# Patient Record
Sex: FEMALE | Race: WHITE | Employment: FULL TIME | URBAN - METROPOLITAN AREA
[De-identification: names, ages, dates, MRNs, and addresses within clinical notes are randomized per-mention and may not be internally consistent; named-entity substitution may affect disease eponyms.]

---

## 2017-07-11 ENCOUNTER — HOSPITAL ENCOUNTER (OUTPATIENT)
Dept: PHYSICAL THERAPY | Age: 52
Discharge: HOME | End: 2017-07-11
Payer: OTHER GOVERNMENT

## 2017-07-11 PROCEDURE — 97018 PARAFFIN BATH THERAPY: CPT

## 2017-07-11 PROCEDURE — 97165 OT EVAL LOW COMPLEX 30 MIN: CPT

## 2017-07-11 PROCEDURE — 97110 THERAPEUTIC EXERCISES: CPT

## 2017-07-11 NOTE — PROGRESS NOTES
OT EVALTREATMENT NOTE 4-16    Patient Name: Gloria Lara  Date:2017  : 1965  [x]  Patient  Verified  Payor:  / Plan: [facility]  RETIREES AND DEPENDENTS / Product Type:  /    In time:245  Out time:245  Total Treatment Time (min): 60  Visit #: 1 of 8    Treatment Area: Pain in right hand [M79.641]    SUBJECTIVE  Pain Level (0-10 scale): 2  Any medication changes, allergies to medications, adverse drug reactions, diagnosis change, or new procedure performed?: [x] No    [] Yes (see summary sheet for update)  Subjective functional status/changes:   [x] See paper eval form in chart    OBJECTIVE     Modality rationale: decrease pain and increase tissue extensibility to improve the patients ability to grasp   Min Type Additional Details    [] Estim:  []Unatt       []IFC  []Premod                        []Other:  []w/ice   []w/heat  Position:  Location:    [] Estim: []Att    []TENS instruct  []NMES                    []Other:  []w/US   []w/ice   []w/heat  Position:  Location:    []  Traction: [] Cervical       []Lumbar                       [] Prone          []Supine                       []Intermittent   []Continuous Lbs:  [] before manual  [] after manual    []  Ultrasound: []Continuous   [] Pulsed                           []1MHz   []3MHz Location:  W/cm2:    []  Iontophoresis with dexamethasone         Location: [] Take home patch   [] In clinic    []  Ice     []  heat  []  Ice massage  []  Laser   []  Anodyne Position:  Location:   10 []  Laser with stim  [x]  Other: paraffin wax  Position:  Location: right hand    []  Vasopneumatic Device Pressure:       [] lo [] med [] hi   Temperature: [] lo [] med [] hi   [x] Skin assessment post-treatment:  [x]intact []redness- no adverse reaction    []redness - adverse reaction:     15 min Therapeutic Exercise: see flow sheet   Rationale: increase ROM and increase strength  to improve the patients ability to grasp without pain.     With   [x] TE [] TA   [] neuro   [] other: Patient Education: [x] Review HEP    [] Progressed/Changed HEP based on:   [] positioning   [] body mechanics   [] transfers   [] heat/ice application   [] Splint wear/care   [] Sensory re-education   [] scar management      [] other:            Pain Level (0-10 scale) post treatment: 2    ASSESSMENT/Changes in Function:      [x]  See Plan of Care         Patient Goals:  Short Term Goals: To be accomplished in 2  weeks:  Goal:* Patient will be compliant with initial home exercise program to take an active role in their rehabilitation process. Status at eval:  HEP initiated    Long Term Goals: To be accomplished in 4 weeks:  Goal:*Patient will increase right hand  strength by at least 10 lbs demonstrating increased functional use. Status at eval: 13 lbs    Goal:* Patient will report 2/10 or less pain with typing and work related activities. Status at Eval: 5+    Goal:* Patient will improve FOTO score by at least 10 points demonstrating increased functional use of left hand.   Status at Eval: 37    PLAN  [x]  Upgrade activities as tolerated     [x]  Continue plan of care  []  Update interventions per flow sheet       []  Discharge due to:_  []  Other:_      Dahlia Flood OT 7/11/2017  2:42 PM

## 2017-07-11 NOTE — PROGRESS NOTES
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 Assessment/ key information:   Patient is a 46year old female presents with right hand pain. Patient reports pain has been ongoing since May 2017 after gardening and spreading mulch around her yard. Patient reports middle finger (right) begins to swell mid afternoon and pain begins at nighttime. Patient reports no relief from ice or heat. Patient displaying triggering at bilateral middle fingers with no pain involved. Patients right hand is mildly stiff and limited in  strength when compared to left. Patient will benefit from OT to address these deficits.     Evaluation Complexity: History LOW Complexity : Brief history review  Examination LOW Complexity : 1-3 performance deficits relating to physical, cognitive , or psychosocial skils that result in activity limitations and / or participation restrictions  Clinical Decision Making LOW Complexity : No comorbidities that affect functional and no verbal or physical assistance needed to complete eval tasks   Overall Complexity Rating: LOW     Problem List: Pain effecting function, Decreased range of motion, Decreased strength, Decreased coordination/prehension and Decreased ADL/functional abilities      Treatment Plan may include any combination of the following: Therapeutic exercise, Therapeutic activities, Physical agent/modality, Patient education and ADLs/IADLs    Patient / Family readiness to learn indicated by: asking questions, trying to perform skills and interest    Persons(s) to be included in education:   patient (P)    Barriers to Learning/Limitations: None    Patient Goal (s): To get rid of pain.     Patient Self Reported Health Status: excellent    Rehabilitation Potential: good    Short Term Goals: To be accomplished in 2  weeks:  Goal:* Patient will be compliant with initial home exercise program to take an active role in their rehabilitation process. Status at eval:  HEP initiated    Long Term Goals: To be accomplished in 4 weeks:  Goal:*Patient will increase right hand  strength by at least 10 lbs demonstrating increased functional use. Status at eval: 13 lbs    Goal:* Patient will report 2/10 or less pain with typing and work related activities. Status at Eval: 5+    Goal:* Patient will improve FOTO score by at least 10 points demonstrating increased functional use of left hand. Status at Eval: 37     Frequency / Duration: Patient to be seen 2 times per week for 4 weeks:    Patient/ Caregiver education and instruction:Diagnosis, prognosis, activity modification and exercises  [x]  Plan of care has been reviewed with [place], OT 7/11/2017 2:42 PM________________________________________________________________________    I certify that the above Therapy Services are being furnished while the patient is under my care. I agree with the treatment plan and certify that this therapy is necessary.     Physician's Signature:____________________  Date:____________Time:__________    Please sign and return to [facility] [address] [place], [address] [facility]  Ph [phone]  Fx [phone]

## 2017-07-18 ENCOUNTER — HOSPITAL ENCOUNTER (OUTPATIENT)
Dept: PHYSICAL THERAPY | Age: 52
Discharge: HOME | End: 2017-07-18
Payer: OTHER GOVERNMENT

## 2017-07-18 PROCEDURE — 97018 PARAFFIN BATH THERAPY: CPT

## 2017-07-18 PROCEDURE — 97110 THERAPEUTIC EXERCISES: CPT

## 2017-07-18 NOTE — PROGRESS NOTES
OT DAILY TREATMENT NOTE      Patient Name: Dottie Sue  Date:2017  : 1965  [x]  Patient  Verified  Payor:  / Plan: [facility]  RETIREES AND DEPENDENTS / Product Type: Merry Leak /    In time:8:00  Out time:840  Total Treatment Time (min): 40  Visit #: 2 of 8     Treatment Area: Pain in right hand [M28.193]    SUBJECTIVE  Pain Level (0-10 scale): 2  Any medication changes, allergies to medications, adverse drug reactions, diagnosis change, or new procedure performed?: [x] No    [] Yes (see summary sheet for update)  Subjective functional status/changes:   [] No changes reported  \"It gets really stiff when I walk. \"    OBJECTIVE     Modality rationale: decrease pain and increase tissue extensibility to improve the patients ability to grasp release and carry items.    Min Type Additional Details    [] Estim:  []Unatt       []IFC  []Premod                        []Other:  []w/ice   []w/heat  Position:  Location:    [] Estim: []Att    []TENS instruct  []NMES                    []Other:  []w/US   []w/ice   []w/heat  Position:  Location:    []  Traction: [] Cervical       []Lumbar                       [] Prone          []Supine                       []Intermittent   []Continuous Lbs:  [] before manual  [] after manual    []  Ultrasound: []Continuous   [] Pulsed                           []1MHz   []3MHz W/cm2:  Location:    []  Iontophoresis with dexamethasone         Location: [] Take home patch   [] In clinic    []  Ice     []  heat  []  Ice massage  []  Laser   []  Anodyne Position:  Location:   10 []  Laser with stim  [x]  Other: paraffin wax  Position: resting  Location: right hand    []  Vasopneumatic Device Pressure:       [] lo [] med [] hi   Temperature: [] lo [] med [] hi   [x] Skin assessment post-treatment:  [x]intact []redness- no adverse reaction    []redness - adverse reaction:      30 min Therapeutic Exercise:  [x] See flow sheet :   Rationale: increase ROM and increase strength to improve the patients ability to perform functional grasping. With   [x] TE   [] TA   [] neuro   [] other: Patient Education: [x] Review HEP    [] Progressed/Changed HEP based on:   [] positioning   [] body mechanics   [] transfers   [] heat/ice application   [] Splint wear/care   [] Sensory re-education   [] scar management      [] other:          Other Objective/Functional Measures: none taken today. Pain Level (0-10 scale) post treatment: 2/10    ASSESSMENT/Changes in Function: Patient tolerated OT well today. Patient reports exercises helping minimally. Patient will continue to benefit from skilled OT services to address ROM deficits, address strength deficits and analyze and address soft tissue restrictions to attain remaining goals. [x]  See Plan of Care  []  See progress note/recertification  []  See Discharge Summary         Progress towards goals / Updated goals:  Short Term Goals: To be accomplished in 2  weeks:  Goal:* Patient will be compliant with initial home exercise program to take an active role in their rehabilitation process. Status at eval:  HEP initiated  Current Status: Patient continuing HEP.     Long Term Goals: To be accomplished in 4 weeks:  Goal:*Patient will increase right hand  strength by at least 10 lbs demonstrating increased functional use. Status at eval: 13 lbs  Current Status: NT     Goal:* Patient will report 2/10 or less pain with typing and work related activities. Status at Eval: 5+  Current Status: NT     Goal:* Patient will improve FOTO score by at least 10 points demonstrating increased functional use of left hand.   Status at Eval: 37  Current Status: NT    PLAN  [x]  Upgrade activities as tolerated     [x]  Continue plan of care  []  Update interventions per flow sheet       []  Discharge due to:_  []  Other:_      Lui Warren OT 7/18/2017  8:11 AM

## 2017-07-25 ENCOUNTER — HOSPITAL ENCOUNTER (OUTPATIENT)
Dept: PHYSICAL THERAPY | Age: 52
Discharge: HOME | End: 2017-07-25
Payer: OTHER GOVERNMENT

## 2017-07-25 PROCEDURE — 97110 THERAPEUTIC EXERCISES: CPT

## 2017-07-25 PROCEDURE — 97018 PARAFFIN BATH THERAPY: CPT

## 2017-07-25 NOTE — PROGRESS NOTES
OT DAILY TREATMENT NOTE      Patient Name: Skylar Birch  Date:2017  : 1965  [x]  Patient  Verified  Payor:  / Plan: [facility]  RETIREES AND DEPENDENTS / Product Type: Creasie Decree /    In time:8:30  Out time:915  Total Treatment Time (min): 45  Visit #: 3 of 8    Treatment Area: Pain in right hand [M27.501]    SUBJECTIVE  Pain Level (0-10 scale): 2/10  Any medication changes, allergies to medications, adverse drug reactions, diagnosis change, or new procedure performed?: [x] No    [] Yes (see summary sheet for update)  Subjective functional status/changes:   [] No changes reported  \"I think I am getting better! I was able to spray this can today when I was working in the yard. \"    OBJECTIVE    Modality rationale: decrease pain and increase tissue extensibility to improve the patients ability to grasp release and perform functional IADLs.    Min Type Additional Details    [] Estim:  []Unatt       []IFC  []Premod                        []Other:  []w/ice   []w/heat  Position:  Location:    [] Estim: []Att    []TENS instruct  []NMES                    []Other:  []w/US   []w/ice   []w/heat  Position:  Location:    []  Traction: [] Cervical       []Lumbar                       [] Prone          []Supine                       []Intermittent   []Continuous Lbs:  [] before manual  [] after manual    []  Ultrasound: []Continuous   [] Pulsed                           []1MHz   []3MHz W/cm2:  Location:    []  Iontophoresis with dexamethasone         Location: [] Take home patch   [] In clinic    []  Ice     []  heat  []  Ice massage  []  Laser   []  Anodyne Position:  Location:   10 []  Laser with stim  [x]  Other: paraffin wax  Position: resting  Location: right hand    []  Vasopneumatic Device Pressure:       [] lo [] med [] hi   Temperature: [] lo [] med [] hi   [x] Skin assessment post-treatment:  [x]intact []redness- no adverse reaction    []redness - adverse reaction:     35 min Therapeutic Exercise:  [x] See flow sheet :   Rationale: increase ROM, increase strength and improve coordination to improve the patients ability to perform functional IADLs with increased motion and decreased pain. With   [x] TE   [] TA   [] neuro   [] other: Patient Education: [x] Review HEP    [] Progressed/Changed HEP based on:   [] positioning   [] body mechanics   [] transfers   [] heat/ice application   [] Splint wear/care   [] Sensory re-education   [] scar management      [] other:         Other Objective/Functional Measures: none taken     Pain Level (0-10 scale) post treatment: 2    ASSESSMENT/Changes in Function: Patient tolerated OT well. Tolerated increase in resistive exercises well. Patient will continue to benefit from skilled OT services to address ROM deficits, address strength deficits and analyze and address soft tissue restrictions to attain remaining goals. [x]  See Plan of Care  []  See progress note/recertification  []  See Discharge Summary         Progress towards goals / Updated goals:  Short Term Goals: To be accomplished in 2  weeks:  Goal:* Patient will be compliant with initial home exercise program to take an active role in their rehabilitation process. Status at eval:  HEP initiated  Current Status: Patient continuing HEP.     Long Term Goals: To be accomplished in 4 weeks:  Goal:*Patient will increase right hand  strength by at least 10 lbs demonstrating increased functional use. Status at eval: 13 lbs  Current Status: NT      Goal:* Patient will report 2/10 or less pain with typing and work related activities. Status at Eval: 5+  Current Status: NT      Goal:* Patient will improve FOTO score by at least 10 points demonstrating increased functional use of left hand.   Status at Eval: 37  Current Status: NT    PLAN  [x]  Upgrade activities as tolerated     [x]  Continue plan of care  []  Update interventions per flow sheet       []  Discharge due to:_  []  Other:_      Rajni Freeman Garry Balbuena OT 7/25/2017  8:38 AM

## 2017-08-01 ENCOUNTER — HOSPITAL ENCOUNTER (OUTPATIENT)
Dept: PHYSICAL THERAPY | Age: 52
Discharge: HOME | End: 2017-08-01
Payer: OTHER GOVERNMENT

## 2017-08-01 PROCEDURE — 97018 PARAFFIN BATH THERAPY: CPT

## 2017-08-01 PROCEDURE — 97110 THERAPEUTIC EXERCISES: CPT

## 2017-08-01 NOTE — PROGRESS NOTES
OT DAILY TREATMENT NOTE      Patient Name: Saroj Crowe  Date:2017  : 1965   [x]  Patient  Verified  Payor:  / Plan: [facility]  RETIREES AND DEPENDENTS / Product Type: Kimberly Smiley /    In time:10:00  Out time:1035  Total Treatment Time (min): 35  Visit #: 4 of 8    Treatment Area: Pain in right hand [M79.641]    SUBJECTIVE  Pain Level (0-10 scale): 3/10  Any medication changes, allergies to medications, adverse drug reactions, diagnosis change, or new procedure performed?: [x] No    [] Yes (see summary sheet for update)  Subjective functional status/changes:   [] No changes reported  \"I think I may have done something to my hand. \"    OBJECTIVE    Modality rationale: increase tissue extensibility to improve the patients ability to grasp and carry.    Min Type Additional Details    [] Estim:  []Unatt       []IFC  []Premod                        []Other:  []w/ice   []w/heat  Position:  Location:    [] Estim: []Att    []TENS instruct  []NMES                    []Other:  []w/US   []w/ice   []w/heat  Position:  Location:    []  Traction: [] Cervical       []Lumbar                       [] Prone          []Supine                       []Intermittent   []Continuous Lbs:  [] before manual  [] after manual    []  Ultrasound: []Continuous   [] Pulsed                           []1MHz   []3MHz W/cm2:  Location:    []  Iontophoresis with dexamethasone         Location: [] Take home patch   [] In clinic    []  Ice     []  heat  []  Ice massage  []  Laser   []  Anodyne Position:  Location:   10 []  Laser with stim  [x]  Other: parrafin wax Position:  Location:    []  Vasopneumatic Device Pressure:       [] lo [] med [] hi   Temperature: [] lo [] med [] hi   [x] Skin assessment post-treatment:  [x]intact []redness- no adverse reaction    []redness - adverse reaction:     25 min Therapeutic Exercise:  [x] See flow sheet :   Rationale: increase ROM and increase strength to improve the patients ability to perform functional grasping and lifting. With   [x] TE   [] TA   [] neuro   [] other: Patient Education: [x] Review HEP    [] Progressed/Changed HEP based on:   [] positioning   [] body mechanics   [] transfers   [] heat/ice application   [] Splint wear/care   [] Sensory re-education   [] scar management      [] other:          Other Objective/Functional Measures: None taken today. Pain Level (0-10 scale) post treatment: 3    ASSESSMENT/Changes in Function: Patient reports increased in stiffness today, reports she feels a minor set back- went on an obstacle course last week which may have done it. Reassess next visit. Patient will continue to benefit from skilled OT services to address ROM deficits, address strength deficits and analyze and address soft tissue restrictions to attain remaining goals. [x]  See Plan of Care  []  See progress note/recertification  []  See Discharge Summary         Progress towards goals / Updated goals:  Short Term Goals: To be accomplished in 2  weeks:  Goal:* Patient will be compliant with initial home exercise program to take an active role in their rehabilitation process. Status at eval:  HEP initiated  Current Status: Patient continuing HEP.     Long Term Goals: To be accomplished in 4 weeks:  Goal:*Patient will increase right hand  strength by at least 10 lbs demonstrating increased functional use. Status at eval: 13 lbs  Current Status: NT      Goal:* Patient will report 2/10 or less pain with typing and work related activities. Status at Eval: 5+  Current Status: NT      Goal:* Patient will improve FOTO score by at least 10 points demonstrating increased functional use of left hand.   Status at Eval: 37  Current Status: NT    PLAN  [x]  Upgrade activities as tolerated     [x]  Continue plan of care  []  Update interventions per flow sheet       []  Discharge due to:_  []  Other:_      Vipin Coyle OT 8/1/2017  10:30 AM

## 2017-08-15 ENCOUNTER — HOSPITAL ENCOUNTER (OUTPATIENT)
Dept: PHYSICAL THERAPY | Age: 52
Discharge: HOME | End: 2017-08-15
Payer: OTHER GOVERNMENT

## 2017-08-15 PROCEDURE — 97110 THERAPEUTIC EXERCISES: CPT

## 2017-08-15 PROCEDURE — 97018 PARAFFIN BATH THERAPY: CPT

## 2017-08-15 NOTE — PROGRESS NOTES
OT DAILY TREATMENT NOTE      Patient Name: Nasir Saeed  Date:8/15/2017  : 1965  [x]  Patient  Verified  Payor: [facility] / Plan: [facility]  RETIREES AND DEPENDENTS / Product Type: T[employer] Putty /    In time:900  Out time:935  Total Treatment Time (min): 35  Visit #: 5 of 8    Treatment Area: Pain in right hand [M79.641]    SUBJECTIVE  Pain Level (0-10 scale): 5  Any medication changes, allergies to medications, adverse drug reactions, diagnosis change, or new procedure performed?: [x] No    [] Yes (see summary sheet for update)  Subjective functional status/changes:   [] No changes reported  \"It has actually gotten worse. \"    OBJECTIVE    Modality rationale: decrease pain and increase tissue extensibility to improve the patients ability to grasp and release.    Min Type Additional Details    [] Estim:  []Unatt       []IFC  []Premod                        []Other:  []w/ice   []w/heat  Position:  Location:    [] Estim: []Att    []TENS instruct  []NMES                    []Other:  []w/US   []w/ice   []w/heat  Position:  Location:    []  Traction: [] Cervical       []Lumbar                       [] Prone          []Supine                       []Intermittent   []Continuous Lbs:  [] before manual  [] after manual    []  Ultrasound: []Continuous   [] Pulsed                           []1MHz   []3MHz W/cm2:  Location:    []  Iontophoresis with dexamethasone         Location: [] Take home patch   [] In clinic    []  Ice     []  heat  []  Ice massage  []  Laser   []  Anodyne Position:  Location:   10 []  Laser with stim  [x]  Other:  Position: resting  Location: right hand    []  Vasopneumatic Device Pressure:       [] lo [] med [] hi   Temperature: [] lo [] med [] hi   [x] Skin assessment post-treatment:  [x]intact []redness- no adverse reaction    []redness - adverse reaction:     25 min Therapeutic Exercise:  [x] See flow sheet :   Rationale: increase ROM, increase strength and improve coordination to improve the patients ability to grasp and release. With   [x] TE   [] TA   [] neuro   [] other: Patient Education: [x] Review HEP    [] Progressed/Changed HEP based on:   [] positioning   [] body mechanics   [] transfers   [] heat/ice application   [] Splint wear/care   [] Sensory re-education   [] scar management      [] other:         Other Objective/Functional Measures: Right  17 lbs, Left 25 lbs     Pain Level (0-10 scale) post treatment: 5    ASSESSMENT/Changes in Function: Patient continuing to experience random pains in right hand, also reports feeling soreness in whole body. Patient has made a follow up with MD for MRI and additional testing at right hand. Patient will continue to benefit from skilled OT services to address ROM deficits, address strength deficits and analyze and address soft tissue restrictions to attain remaining goals. [x]  See Plan of Care  []  See progress note/recertification  []  See Discharge Summary         Progress towards goals / Updated goals:  Short Term Goals: To be accomplished in 2  weeks:  Goal:* Patient will be compliant with initial home exercise program to take an active role in their rehabilitation process. Status at eval:  HEP initiated  Current Status: Patient continuing HEP.     Long Term Goals: To be accomplished in 4 weeks:  Goal:*Patient will increase right hand  strength by at least 10 lbs demonstrating increased functional use. Status at eval: 13 lbs  Current Status: 17 lbs      Goal:* Patient will report 2/10 or less pain with typing and work related activities. Status at Eval: 5+  Current Status: 5      Goal:* Patient will improve FOTO score by at least 10 points demonstrating increased functional use of left hand.   Status at Eval: 37  Current Status: 45    PLAN  [x]  Upgrade activities as tolerated     [x]  Continue plan of care  []  Update interventions per flow sheet       []  Discharge due to:_  []  Other:_      Trinity Amor OT 8/15/2017  8:57 AM

## 2017-10-05 NOTE — PROGRESS NOTES
[facility] [address] 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 Visits from Start of Care: 5    Missed Visits: 0    Summary of Care:  Patient continues to experience random sharp shooting pains and stiffness in right hand. Patient to follow back up with MD due to lack of progress. Short Term Goals: To be accomplished in 2  weeks:  Goal:* Patient will be compliant with initial home exercise program to take an active role in their rehabilitation process. Status at eval:  HEP initiated  Current Status: Patient continuing HEP.     Long Term Goals: To be accomplished in 4 weeks:  Goal:*Patient will increase right hand  strength by at least 10 lbs demonstrating increased functional use. Status at eval: 13 lbs  Current Status: 17 lbs      Goal:* Patient will report 2/10 or less pain with typing and work related activities. Status at Eval: 5+  Current Status: 4      Goal:* Patient will improve FOTO score by at least 10 points demonstrating increased functional use of left hand.   Status at Eval: 37  Current Status: 38    ASSESSMENT/RECOMMENDATIONS:  [x]Discontinue therapy: []Patient has reached or is progressing toward set goals      [x]Patient is non-compliant or has abdicated      [x]Due to lack of appreciable progress towards set [address], OT 10/5/2017 9:35 AM